# Patient Record
Sex: FEMALE | Race: WHITE | Employment: FULL TIME | URBAN - METROPOLITAN AREA
[De-identification: names, ages, dates, MRNs, and addresses within clinical notes are randomized per-mention and may not be internally consistent; named-entity substitution may affect disease eponyms.]

---

## 2018-07-19 PROBLEM — M77.11 LATERAL EPICONDYLITIS OF RIGHT ELBOW: Status: ACTIVE | Noted: 2018-07-19

## 2019-08-02 ENCOUNTER — HOSPITAL ENCOUNTER (OUTPATIENT)
Dept: ULTRASOUND IMAGING | Facility: HOSPITAL | Age: 44
Discharge: HOME | End: 2019-08-02
Attending: NURSE PRACTITIONER
Payer: COMMERCIAL

## 2019-08-02 DIAGNOSIS — M79.669 CALF PAIN, UNSPECIFIED LATERALITY: ICD-10-CM

## 2019-08-02 PROCEDURE — 93971 EXTREMITY STUDY: CPT | Performed by: NURSE PRACTITIONER

## 2019-08-09 ENCOUNTER — OFFICE VISIT (OUTPATIENT)
Dept: HEMATOLOGY/ONCOLOGY | Facility: HOSPITAL | Age: 44
End: 2019-08-09
Attending: INTERNAL MEDICINE
Payer: COMMERCIAL

## 2019-08-09 VITALS
HEIGHT: 62.5 IN | WEIGHT: 131 LBS | SYSTOLIC BLOOD PRESSURE: 105 MMHG | BODY MASS INDEX: 23.5 KG/M2 | RESPIRATION RATE: 18 BRPM | OXYGEN SATURATION: 97 % | DIASTOLIC BLOOD PRESSURE: 68 MMHG | HEART RATE: 97 BPM

## 2019-08-09 DIAGNOSIS — Z00.00 HEALTHCARE MAINTENANCE: Primary | ICD-10-CM

## 2019-08-09 PROCEDURE — 99243 OFF/OP CNSLTJ NEW/EST LOW 30: CPT | Performed by: INTERNAL MEDICINE

## 2019-08-09 NOTE — PROGRESS NOTES
[address] [facility] Note    Diagnosis: DVT of R Gastrocnemius (8/2/19): provoked by surgery     Treatment History:   1. Xarelto: Took 2 doses, but did not tolerate it   2. Eliquis: 8/9/19-Current     Visit Diagnosis:  No diagnosis found. Procedure Laterality Date   • HERNIA SURGERY     • OTHER      WISDOM TEETH   • RECTO CYSTOCELE REPAIR N/A 8/9/2013    Performed by Niko Weiner MD at [facility] MAIN OR     Social History    Socioeconomic History      Marital status:       Spouse [facility]

## 2019-08-09 NOTE — PROGRESS NOTES
Education Record    Learner:  Patient and Spouse    Disease / Diagnosis:DVT to RLE    Barriers / Limitations:  None   Comments:    Method:  Discussion   Comments:    General Topics:  Plan of care reviewed   Comments:    Outcome:  Shows understanding   Comm

## 2019-08-12 ENCOUNTER — TELEPHONE (OUTPATIENT)
Dept: HEMATOLOGY/ONCOLOGY | Facility: HOSPITAL | Age: 44
End: 2019-08-12

## 2019-08-12 NOTE — TELEPHONE ENCOUNTER
Patient called stating she has a bad head cold. Asking if Dr. Marcos Mckeon would prescribe her an antibiotic. Instructed patient to call her PCP or visit urgent care if unable to see PCP. Patient verbalized understanding.

## 2019-08-16 ENCOUNTER — HOSPITAL ENCOUNTER (OUTPATIENT)
Dept: ULTRASOUND IMAGING | Facility: HOSPITAL | Age: 44
Discharge: HOME | End: 2019-08-16
Attending: FAMILY MEDICINE
Payer: COMMERCIAL

## 2019-08-16 DIAGNOSIS — I82.491 ACUTE DEEP VEIN THROMBOSIS (DVT) OF OTHER SPECIFIED VEIN OF RIGHT LOWER EXTREMITY (HCC): ICD-10-CM

## 2019-08-16 PROCEDURE — 93971 EXTREMITY STUDY: CPT | Performed by: FAMILY MEDICINE

## 2019-09-05 ENCOUNTER — OFFICE VISIT (OUTPATIENT)
Dept: HEMATOLOGY/ONCOLOGY | Facility: HOSPITAL | Age: 44
End: 2019-09-05
Attending: INTERNAL MEDICINE
Payer: COMMERCIAL

## 2019-09-05 ENCOUNTER — TELEPHONE (OUTPATIENT)
Dept: HEMATOLOGY/ONCOLOGY | Facility: HOSPITAL | Age: 44
End: 2019-09-05

## 2019-09-05 ENCOUNTER — HOSPITAL ENCOUNTER (OUTPATIENT)
Dept: ULTRASOUND IMAGING | Age: 44
Discharge: HOME | End: 2019-09-05
Attending: NURSE PRACTITIONER
Payer: COMMERCIAL

## 2019-09-05 VITALS
WEIGHT: 135 LBS | HEART RATE: 90 BPM | DIASTOLIC BLOOD PRESSURE: 71 MMHG | TEMPERATURE: 97 F | HEIGHT: 62.52 IN | RESPIRATION RATE: 18 BRPM | OXYGEN SATURATION: 97 % | SYSTOLIC BLOOD PRESSURE: 107 MMHG | BODY MASS INDEX: 24.22 KG/M2

## 2019-09-05 DIAGNOSIS — Z86.718 HISTORY OF DEEP VENOUS THROMBOSIS (DVT) OF DISTAL VEIN OF RIGHT LOWER EXTREMITY: ICD-10-CM

## 2019-09-05 DIAGNOSIS — M79.89 RIGHT LEG SWELLING: Primary | ICD-10-CM

## 2019-09-05 DIAGNOSIS — M79.89 RIGHT LEG SWELLING: ICD-10-CM

## 2019-09-05 PROBLEM — N76.0 VAGINITIS: Status: ACTIVE | Noted: 2019-09-05

## 2019-09-05 PROBLEM — M20.10 HALLUX VALGUS, ACQUIRED: Status: ACTIVE | Noted: 2019-04-21

## 2019-09-05 PROBLEM — N83.209 CYST OF OVARY: Status: ACTIVE | Noted: 2019-09-05

## 2019-09-05 PROCEDURE — 99213 OFFICE O/P EST LOW 20 MIN: CPT | Performed by: NURSE PRACTITIONER

## 2019-09-05 PROCEDURE — 93971 EXTREMITY STUDY: CPT | Performed by: NURSE PRACTITIONER

## 2019-09-05 RX ORDER — AZITHROMYCIN 250 MG/1
TABLET, FILM COATED ORAL
Refills: 0 | COMMUNITY
Start: 2019-08-22 | End: 2021-03-08

## 2019-09-05 RX ORDER — ACETAMINOPHEN AND CODEINE PHOSPHATE 300; 30 MG/1; MG/1
TABLET ORAL
Refills: 0 | COMMUNITY
Start: 2019-08-02 | End: 2019-09-05

## 2019-09-05 NOTE — PROGRESS NOTES
ANP Visit Note    Patient Name: Jose Mukherjee   YOB: 1975   Medical Record Number: [id number]   CSN: [id number]   Date of visit: 9/5/2019       Chief Complaint/Reason for Visit:  Patient presents with:   Follow - Up: apn assessment     Hist Marital status:       Spouse name: Not on file      Number of children: Not on file      Years of education: Not on file      Highest education level: Not on file    Occupational History      Not on file    Social Needs      Financial resource strai 3  •  Levothyroxine Sodium (SYNTHROID) 75 MCG Oral Tab, Take 125 mcg by mouth daily. 62.5 over 2 day period, recently changed dosage since September , Disp: , Rfl:   •  Multiple Vitamins-Minerals (MULTIVITAMIN OR), Take  by mouth as needed. , Disp: , Rfl: month follow up with Dr. Albertina Marroquin: HIGH recent DVT on anticoagulation     Electronically Signed by:    Leah Vang NP-C  Nurse Practitioner  [facility]

## 2019-09-05 NOTE — PROGRESS NOTES
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 Patient also noticed bump on shin area.      Sanjuana Toscano

## 2019-09-05 NOTE — TELEPHONE ENCOUNTER
Patient called with questions regarding wearing her boot and some issues she is having.  Please advise

## 2019-09-05 NOTE — TELEPHONE ENCOUNTER
Spoke with patient. She states she has been taking eliquis as prescribed. Her leg has become more swollen and feels \"tight\". She noticed a new lump on her leg. APN notified. Patient will be seen in clinic this afternoon. Patient verbalized understanidng.

## 2019-09-06 ENCOUNTER — TELEPHONE (OUTPATIENT)
Dept: HEMATOLOGY/ONCOLOGY | Facility: HOSPITAL | Age: 44
End: 2019-09-06

## 2019-09-06 NOTE — TELEPHONE ENCOUNTER
Patient returned call. Discussed that US right leg was negative for DVT. Patient instructed to continue to take eliquis as prescribed. Can take elevating legs and mild compression socks during the day while standing.  No other issues or complaints at this t

## 2019-11-06 NOTE — PROGRESS NOTES
[facility] On[facility] Clinic Note    Diagnosis: DVT of R Gastrocnemius (8/2/19): provoked by surgery     Treatment History:   1. Xarelto: Took 2 doses, but did not tolerate it   2.  Eliquis: 8/9/19-11/7/19    Visit Diagnosis:  History of deep venou Disp: , Rfl:   apixaban (ELIQUIS) 5 MG Oral Tab, Take 1 tablet (5 mg total) by mouth 2 (two) times daily. , Disp: 60 tablet, Rfl: 3  apixaban (ELIQUIS STARTER PACK) 5 MG Oral Tab, Take 1 tablet (5 mg total) by mouth 2 (two) times daily.  10 mg twice a day fo 05/01/2016    HGB 14.5 05/01/2016    HCT 43.2 05/01/2016    MCV 95.2 05/01/2016    .0 05/01/2016     Lab Results   Component Value Date     05/01/2016    K 4.2 05/01/2016    CO2 28.0 05/01/2016     05/01/2016    BUN 9 05/01/2016    ALB 3

## 2019-11-07 ENCOUNTER — OFFICE VISIT (OUTPATIENT)
Dept: HEMATOLOGY/ONCOLOGY | Facility: HOSPITAL | Age: 44
End: 2019-11-07
Attending: INTERNAL MEDICINE
Payer: COMMERCIAL

## 2019-11-07 VITALS
OXYGEN SATURATION: 100 % | RESPIRATION RATE: 16 BRPM | SYSTOLIC BLOOD PRESSURE: 106 MMHG | BODY MASS INDEX: 24.47 KG/M2 | DIASTOLIC BLOOD PRESSURE: 68 MMHG | TEMPERATURE: 98 F | HEART RATE: 80 BPM | HEIGHT: 62.52 IN | WEIGHT: 136.38 LBS

## 2019-11-07 DIAGNOSIS — R53.83 FATIGUE, UNSPECIFIED TYPE: ICD-10-CM

## 2019-11-07 DIAGNOSIS — Z86.718 HISTORY OF DEEP VENOUS THROMBOSIS (DVT) OF DISTAL VEIN OF RIGHT LOWER EXTREMITY: Primary | ICD-10-CM

## 2019-11-07 PROCEDURE — 99213 OFFICE O/P EST LOW 20 MIN: CPT | Performed by: INTERNAL MEDICINE

## 2019-11-07 NOTE — PROGRESS NOTES
Education Record    Learner:  Patient    Disease / Diagnosis:DVT     Barriers / Limitations:  None   Comments:    Method:  Discussion   Comments:    General Topics:  Plan of care reviewed   Comments:    Outcome:  Shows understanding   Comments:    Patient

## 2019-11-08 ENCOUNTER — TELEPHONE (OUTPATIENT)
Dept: HEMATOLOGY/ONCOLOGY | Facility: HOSPITAL | Age: 44
End: 2019-11-08

## 2019-11-08 NOTE — TELEPHONE ENCOUNTER
Spoke with patient. She verbalized understanding. Follow-up appointment made. Yeny Rios MD  [facility]             Results reviewed.  Can you please let her know that all her tests came out normal. Her iron level is borderline normal, sh

## 2019-12-02 ENCOUNTER — TELEPHONE (OUTPATIENT)
Dept: HEMATOLOGY/ONCOLOGY | Facility: HOSPITAL | Age: 44
End: 2019-12-02

## 2019-12-02 NOTE — TELEPHONE ENCOUNTER
LVM for patient informing her that US order has been placed. Central Scheduling contact information provided.

## 2019-12-02 NOTE — TELEPHONE ENCOUNTER
Evans Charles called to say that the last time she was in she was taken off blood thinners.  She wants another US done because she is still in pain and she is looking to speak with a nurse about the possibility of getting an [address] and looking to just talk about georgina

## 2019-12-06 ENCOUNTER — TELEPHONE (OUTPATIENT)
Dept: HEMATOLOGY/ONCOLOGY | Facility: HOSPITAL | Age: 44
End: 2019-12-06

## 2019-12-06 ENCOUNTER — HOSPITAL ENCOUNTER (OUTPATIENT)
Dept: ULTRASOUND IMAGING | Age: 44
Discharge: HOME | End: 2019-12-06
Attending: INTERNAL MEDICINE
Payer: COMMERCIAL

## 2019-12-06 DIAGNOSIS — M79.89 RIGHT LEG SWELLING: ICD-10-CM

## 2019-12-06 DIAGNOSIS — Z86.718 HISTORY OF DEEP VENOUS THROMBOSIS (DVT) OF DISTAL VEIN OF RIGHT LOWER EXTREMITY: ICD-10-CM

## 2019-12-06 PROCEDURE — 93970 EXTREMITY STUDY: CPT | Performed by: INTERNAL MEDICINE

## 2019-12-06 NOTE — TELEPHONE ENCOUNTER
Spoke with patient. She verbalized understanding. Kaitlin Fonseca MD  [facility]             Results reviewed. US is negative for DVT. Thanks!

## 2019-12-31 ENCOUNTER — TELEPHONE (OUTPATIENT)
Dept: SCHEDULING | Age: 44
End: 2019-12-31

## 2020-02-05 NOTE — PROGRESS NOTES
[facility] Note    Diagnosis: DVT of R Gastrocnemius (8/2/19): provoked by surgery     Treatment History:   1. Xarelto: Took 2 doses, but did not tolerate it   2.  Eliquis: 8/9/19-11/7/19    Visit Diagnosis:  History of deep venou more. She occasionally feels some RLE tightness. She has not noticed and swelling, tenderness or bulging veins. No chest pain or dyspnea. Does feel a bit fatigued, but is only sleeping 6.5 hours a night.      Review of Systems: 12 Point ROS was completed an 02/06/20  1547   BP: 107/71   Pulse: 77   Resp: 16   Temp: 98.2 °F (36.8 °C)     General: NAD, AOX3  HEENT: clear op, mmm, no jvd, no scleral icterus  Extremities: Right and Left LE equal in size, no edema, no varicose veins   Lungs: no increased work of b

## 2020-02-06 ENCOUNTER — OFFICE VISIT (OUTPATIENT)
Dept: HEMATOLOGY/ONCOLOGY | Facility: HOSPITAL | Age: 45
End: 2020-02-06
Attending: INTERNAL MEDICINE
Payer: COMMERCIAL

## 2020-02-06 VITALS
TEMPERATURE: 98 F | OXYGEN SATURATION: 100 % | HEIGHT: 62.52 IN | WEIGHT: 132.19 LBS | RESPIRATION RATE: 16 BRPM | BODY MASS INDEX: 23.72 KG/M2 | HEART RATE: 77 BPM | DIASTOLIC BLOOD PRESSURE: 71 MMHG | SYSTOLIC BLOOD PRESSURE: 107 MMHG

## 2020-02-06 DIAGNOSIS — Z86.718 HISTORY OF DEEP VENOUS THROMBOSIS (DVT) OF DISTAL VEIN OF RIGHT LOWER EXTREMITY: Primary | ICD-10-CM

## 2020-02-06 LAB
BASOPHILS # BLD AUTO: 0.08 X10(3) UL (ref 0–0.2)
BASOPHILS NFR BLD AUTO: 1.4 %
D-DIMER: <0.27 UG/ML FEU (ref ?–0.5)
DEPRECATED HBV CORE AB SER IA-ACNC: 37.6 NG/ML (ref 12–240)
DEPRECATED RDW RBC AUTO: 47 FL (ref 35.1–46.3)
EOSINOPHIL # BLD AUTO: 0.15 X10(3) UL (ref 0–0.7)
EOSINOPHIL NFR BLD AUTO: 2.5 %
ERYTHROCYTE [DISTWIDTH] IN BLOOD BY AUTOMATED COUNT: 13.2 % (ref 11–15)
HCT VFR BLD AUTO: 43.1 % (ref 35–48)
HGB BLD-MCNC: 14.4 G/DL (ref 12–16)
IMM GRANULOCYTES # BLD AUTO: 0.02 X10(3) UL (ref 0–1)
IMM GRANULOCYTES NFR BLD: 0.3 %
LYMPHOCYTES # BLD AUTO: 2.67 X10(3) UL (ref 1–4)
LYMPHOCYTES NFR BLD AUTO: 45.1 %
MCH RBC QN AUTO: 32 PG (ref 26–34)
MCHC RBC AUTO-ENTMCNC: 33.4 G/DL (ref 31–37)
MCV RBC AUTO: 95.8 FL (ref 80–100)
MONOCYTES # BLD AUTO: 0.48 X10(3) UL (ref 0.1–1)
MONOCYTES NFR BLD AUTO: 8.1 %
NEUTROPHILS # BLD AUTO: 2.52 X10 (3) UL (ref 1.5–7.7)
NEUTROPHILS # BLD AUTO: 2.52 X10(3) UL (ref 1.5–7.7)
NEUTROPHILS NFR BLD AUTO: 42.6 %
PLATELET # BLD AUTO: 309 10(3)UL (ref 150–450)
RBC # BLD AUTO: 4.5 X10(6)UL (ref 3.8–5.3)
WBC # BLD AUTO: 5.9 X10(3) UL (ref 4–11)

## 2020-02-06 PROCEDURE — 99213 OFFICE O/P EST LOW 20 MIN: CPT | Performed by: INTERNAL MEDICINE

## 2020-02-06 NOTE — PROGRESS NOTES
Education Record    Learner:  Patient    Disease / Diagnosis:RLE DVT     Barriers / Limitations:  None   Comments:    Method:  Discussion   Comments:    General Topics:  Plan of care reviewed   Comments:    Outcome:  Shows understanding   Comments:    [place]

## 2020-02-07 ENCOUNTER — TELEPHONE (OUTPATIENT)
Dept: HEMATOLOGY/ONCOLOGY | Facility: HOSPITAL | Age: 45
End: 2020-02-07

## 2020-02-07 NOTE — TELEPHONE ENCOUNTER
Spoke with patient. She verbalized understanding    Maria Castro MD  [facility]             Results reviewed.  D -dimer and iron studies are normal. Thanks

## 2021-03-29 ENCOUNTER — OFFICE VISIT (OUTPATIENT)
Dept: UROLOGY | Facility: HOSPITAL | Age: 46
End: 2021-03-29
Attending: OBSTETRICS & GYNECOLOGY
Payer: COMMERCIAL

## 2021-03-29 VITALS — TEMPERATURE: 97 F | HEIGHT: 62.75 IN | WEIGHT: 135 LBS | BODY MASS INDEX: 24.22 KG/M2

## 2021-03-29 DIAGNOSIS — N81.6 RECTOCELE: Primary | ICD-10-CM

## 2021-03-29 PROCEDURE — 99212 OFFICE O/P EST SF 10 MIN: CPT

## 2021-04-01 ENCOUNTER — TELEPHONE (OUTPATIENT)
Dept: HEMATOLOGY/ONCOLOGY | Facility: HOSPITAL | Age: 46
End: 2021-04-01

## 2021-04-01 RX ORDER — SODIUM CHLORIDE, SODIUM LACTATE, POTASSIUM CHLORIDE, CALCIUM CHLORIDE 600; 310; 30; 20 MG/100ML; MG/100ML; MG/100ML; MG/100ML
INJECTION, SOLUTION INTRAVENOUS CONTINUOUS
Status: CANCELLED | OUTPATIENT
Start: 2021-04-01

## 2021-04-01 RX ORDER — ACETAMINOPHEN 500 MG
1000 TABLET ORAL ONCE
Status: CANCELLED | OUTPATIENT
Start: 2021-04-01 | End: 2021-04-01

## 2021-04-01 NOTE — TELEPHONE ENCOUNTER
Patient called and had some questions regarding blood thinners because she is having surgery on 4/19.

## 2021-04-02 ENCOUNTER — TELEPHONE (OUTPATIENT)
Dept: HEMATOLOGY/ONCOLOGY | Facility: HOSPITAL | Age: 46
End: 2021-04-02

## 2021-04-02 NOTE — TELEPHONE ENCOUNTER
Surgery is scheduled for April 19 - Rectocele pelvic surgery. Wants to know if she needs to go back on blood thinners. Does she need appointment with you? Patient is aware   Is out of the office this week. Will be back on 4/5.

## 2021-04-06 NOTE — PROGRESS NOTES
THE [facility] Clinic Note    Diagnosis: DVT of R Gastrocnemius (8/2/19): provoked by surgery     Treatment History:   1. Xarelto: Took 2 doses, but did not tolerate it   2.  Eliquis: 8/9/19-11/7/19    Visit Diagnosis:  History of deep venou more. She occasionally feels some RLE tightness. She has not noticed and swelling, tenderness or bulging veins. No chest pain or dyspnea. Does feel a bit fatigued, but is only sleeping 6.5 hours a night.      4/7/21: Follow up for a history of RLE DVT dx 8/ Vaping Use: Never used    Substance and Sexual Activity      Alcohol use: Yes        Comment: RARE WINE      Drug use: No     Family History   Problem Relation Age of Onset   • Other (Other) Father         blood clots        Physical Exam   04/07/21  154

## 2021-04-07 ENCOUNTER — OFFICE VISIT (OUTPATIENT)
Dept: HEMATOLOGY/ONCOLOGY | Facility: HOSPITAL | Age: 46
End: 2021-04-07
Attending: INTERNAL MEDICINE
Payer: COMMERCIAL

## 2021-04-07 VITALS
OXYGEN SATURATION: 99 % | WEIGHT: 137 LBS | DIASTOLIC BLOOD PRESSURE: 76 MMHG | SYSTOLIC BLOOD PRESSURE: 125 MMHG | HEART RATE: 80 BPM | BODY MASS INDEX: 25 KG/M2 | TEMPERATURE: 98 F | RESPIRATION RATE: 18 BRPM

## 2021-04-07 DIAGNOSIS — Z86.718 HISTORY OF DEEP VENOUS THROMBOSIS (DVT) OF DISTAL VEIN OF RIGHT LOWER EXTREMITY: Primary | ICD-10-CM

## 2021-04-07 PROCEDURE — 99214 OFFICE O/P EST MOD 30 MIN: CPT | Performed by: INTERNAL MEDICINE

## 2021-04-07 RX ORDER — ENOXAPARIN SODIUM 100 MG/ML
60 INJECTION SUBCUTANEOUS DAILY
Qty: 10 SYRINGE | Refills: 0 | Status: SHIPPED | OUTPATIENT
Start: 2021-04-07 | End: 2021-04-17

## 2021-04-07 NOTE — PROGRESS NOTES
Education Record    Learner:  Patient    Disease / Diagnosis:h/o DVT    Barriers / Limitations:  None   Comments:    Method:  Discussion   Comments:    General Topics:  Plan of care reviewed   Comments:    Outcome:  Shows understanding   Comments:    Leo

## 2021-04-08 ENCOUNTER — TELEPHONE (OUTPATIENT)
Dept: HEMATOLOGY/ONCOLOGY | Facility: HOSPITAL | Age: 46
End: 2021-04-08

## 2021-04-08 NOTE — TELEPHONE ENCOUNTER
Spoke with patient. She verbalized understanding. Instructions on lovenox provided. MD Alhaji Mcdermott, RN  Can you let her know that I talked to Dr. Lelia Fontana. He is ok with post-operative Loenox.  She should start the day after surgery

## 2021-04-09 ENCOUNTER — TELEPHONE (OUTPATIENT)
Dept: UROLOGY | Facility: HOSPITAL | Age: 46
End: 2021-04-09

## 2021-04-09 NOTE — TELEPHONE ENCOUNTER
Pt called, preparing [facility] paperwork, for upcoming surgery scheduled 4-19-21, planned posterior repair. Pt requests to be off work 4-19-21 through 4-25-21. 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   And return to work in the [employer]

## 2021-04-12 NOTE — TELEPHONE ENCOUNTER
Pt brought [facility] paperwork, completed, not signed yet by Dr. Joss Gardiner. Called pt to notify paperwork not complete, she requests it be faxed unsigned to her spouse work [phone].   Dr. Joss Gardiner signed the return to work letter on [employer], faxed with pa

## 2021-04-16 ENCOUNTER — LAB ENCOUNTER (OUTPATIENT)
Dept: LAB | Facility: HOSPITAL | Age: 46
End: 2021-04-16
Attending: OBSTETRICS & GYNECOLOGY
Payer: COMMERCIAL

## 2021-04-16 DIAGNOSIS — N81.6 RECTOCELE: ICD-10-CM

## 2021-04-19 ENCOUNTER — ANESTHESIA EVENT (OUTPATIENT)
Dept: SURGERY | Facility: HOSPITAL | Age: 46
End: 2021-04-19
Payer: COMMERCIAL

## 2021-04-19 ENCOUNTER — HOSPITAL ENCOUNTER (OUTPATIENT)
Facility: HOSPITAL | Age: 46
Setting detail: HOSPITAL OUTPATIENT SURGERY
Discharge: HOME | End: 2021-04-19
Attending: OBSTETRICS & GYNECOLOGY | Admitting: OBSTETRICS & GYNECOLOGY
Payer: COMMERCIAL

## 2021-04-19 ENCOUNTER — ANESTHESIA (OUTPATIENT)
Dept: SURGERY | Facility: HOSPITAL | Age: 46
End: 2021-04-19
Payer: COMMERCIAL

## 2021-04-19 VITALS
SYSTOLIC BLOOD PRESSURE: 118 MMHG | HEART RATE: 84 BPM | TEMPERATURE: 98 F | OXYGEN SATURATION: 99 % | HEIGHT: 62.25 IN | DIASTOLIC BLOOD PRESSURE: 78 MMHG | WEIGHT: 136.44 LBS | BODY MASS INDEX: 24.79 KG/M2 | RESPIRATION RATE: 18 BRPM

## 2021-04-19 DIAGNOSIS — N81.6 RECTOCELE: Primary | ICD-10-CM

## 2021-04-19 PROCEDURE — 88305 TISSUE EXAM BY PATHOLOGIST: CPT | Performed by: OBSTETRICS & GYNECOLOGY

## 2021-04-19 PROCEDURE — 81025 URINE PREGNANCY TEST: CPT | Performed by: OBSTETRICS & GYNECOLOGY

## 2021-04-19 PROCEDURE — 0WQN0ZZ REPAIR FEMALE PERINEUM, OPEN APPROACH: ICD-10-PCS | Performed by: OBSTETRICS & GYNECOLOGY

## 2021-04-19 PROCEDURE — 51798 US URINE CAPACITY MEASURE: CPT | Performed by: OBSTETRICS & GYNECOLOGY

## 2021-04-19 PROCEDURE — S0028 INJECTION, FAMOTIDINE, 20 MG: HCPCS | Performed by: ANESTHESIOLOGY

## 2021-04-19 PROCEDURE — 0JBB0ZZ EXCISION OF PERINEUM SUBCUTANEOUS TISSUE AND FASCIA, OPEN APPROACH: ICD-10-PCS | Performed by: OBSTETRICS & GYNECOLOGY

## 2021-04-19 PROCEDURE — 0JQC0ZZ REPAIR PELVIC REGION SUBCUTANEOUS TISSUE AND FASCIA, OPEN APPROACH: ICD-10-PCS | Performed by: OBSTETRICS & GYNECOLOGY

## 2021-04-19 RX ORDER — LIDOCAINE HYDROCHLORIDE 10 MG/ML
INJECTION, SOLUTION EPIDURAL; INFILTRATION; INTRACAUDAL; PERINEURAL AS NEEDED
Status: DISCONTINUED | OUTPATIENT
Start: 2021-04-19 | End: 2021-04-19 | Stop reason: SURG

## 2021-04-19 RX ORDER — BUPIVACAINE HYDROCHLORIDE AND EPINEPHRINE 2.5; 5 MG/ML; UG/ML
INJECTION, SOLUTION EPIDURAL; INFILTRATION; INTRACAUDAL; PERINEURAL AS NEEDED
Status: DISCONTINUED | OUTPATIENT
Start: 2021-04-19 | End: 2021-04-19 | Stop reason: HOSPADM

## 2021-04-19 RX ORDER — HYDROMORPHONE HYDROCHLORIDE 1 MG/ML
0.4 INJECTION, SOLUTION INTRAMUSCULAR; INTRAVENOUS; SUBCUTANEOUS EVERY 5 MIN PRN
Status: DISCONTINUED | OUTPATIENT
Start: 2021-04-19 | End: 2021-04-19

## 2021-04-19 RX ORDER — IBUPROFEN 600 MG/1
600 TABLET ORAL EVERY 6 HOURS
Qty: 40 TABLET | Refills: 0 | Status: SHIPPED | OUTPATIENT
Start: 2021-04-19 | End: 2021-05-28 | Stop reason: ALTCHOICE

## 2021-04-19 RX ORDER — HYDROCODONE BITARTRATE AND ACETAMINOPHEN 5; 325 MG/1; MG/1
1 TABLET ORAL EVERY 6 HOURS PRN
Qty: 20 TABLET | Refills: 0 | Status: SHIPPED | OUTPATIENT
Start: 2021-04-19 | End: 2021-05-28 | Stop reason: ALTCHOICE

## 2021-04-19 RX ORDER — HYDROCODONE BITARTRATE AND ACETAMINOPHEN 5; 325 MG/1; MG/1
1 TABLET ORAL AS NEEDED
Status: DISCONTINUED | OUTPATIENT
Start: 2021-04-19 | End: 2021-04-19

## 2021-04-19 RX ORDER — METOCLOPRAMIDE HYDROCHLORIDE 5 MG/ML
10 INJECTION INTRAMUSCULAR; INTRAVENOUS AS NEEDED
Status: DISCONTINUED | OUTPATIENT
Start: 2021-04-19 | End: 2021-04-19

## 2021-04-19 RX ORDER — ESMOLOL HYDROCHLORIDE 10 MG/ML
INJECTION INTRAVENOUS AS NEEDED
Status: DISCONTINUED | OUTPATIENT
Start: 2021-04-19 | End: 2021-04-19 | Stop reason: SURG

## 2021-04-19 RX ORDER — SODIUM CHLORIDE, SODIUM LACTATE, POTASSIUM CHLORIDE, CALCIUM CHLORIDE 600; 310; 30; 20 MG/100ML; MG/100ML; MG/100ML; MG/100ML
INJECTION, SOLUTION INTRAVENOUS CONTINUOUS
Status: DISCONTINUED | OUTPATIENT
Start: 2021-04-19 | End: 2021-04-19

## 2021-04-19 RX ORDER — DEXAMETHASONE SODIUM PHOSPHATE 4 MG/ML
VIAL (ML) INJECTION AS NEEDED
Status: DISCONTINUED | OUTPATIENT
Start: 2021-04-19 | End: 2021-04-19 | Stop reason: SURG

## 2021-04-19 RX ORDER — HYDROCODONE BITARTRATE AND ACETAMINOPHEN 5; 325 MG/1; MG/1
2 TABLET ORAL AS NEEDED
Status: DISCONTINUED | OUTPATIENT
Start: 2021-04-19 | End: 2021-04-19

## 2021-04-19 RX ORDER — ONDANSETRON 2 MG/ML
4 INJECTION INTRAMUSCULAR; INTRAVENOUS AS NEEDED
Status: DISCONTINUED | OUTPATIENT
Start: 2021-04-19 | End: 2021-04-19

## 2021-04-19 RX ORDER — GLYCOPYRROLATE 0.2 MG/ML
INJECTION, SOLUTION INTRAMUSCULAR; INTRAVENOUS AS NEEDED
Status: DISCONTINUED | OUTPATIENT
Start: 2021-04-19 | End: 2021-04-19 | Stop reason: SURG

## 2021-04-19 RX ORDER — ACETAMINOPHEN 500 MG
1000 TABLET ORAL ONCE AS NEEDED
Status: DISCONTINUED | OUTPATIENT
Start: 2021-04-19 | End: 2021-04-19

## 2021-04-19 RX ORDER — FAMOTIDINE 10 MG/ML
INJECTION, SOLUTION INTRAVENOUS AS NEEDED
Status: DISCONTINUED | OUTPATIENT
Start: 2021-04-19 | End: 2021-04-19 | Stop reason: SURG

## 2021-04-19 RX ORDER — PHENYLEPHRINE HCL 10 MG/ML
VIAL (ML) INJECTION AS NEEDED
Status: DISCONTINUED | OUTPATIENT
Start: 2021-04-19 | End: 2021-04-19 | Stop reason: SURG

## 2021-04-19 RX ORDER — CEFAZOLIN SODIUM/WATER 2 G/20 ML
2 SYRINGE (ML) INTRAVENOUS ONCE
Status: COMPLETED | OUTPATIENT
Start: 2021-04-19 | End: 2021-04-19

## 2021-04-19 RX ORDER — ACETAMINOPHEN 500 MG
1000 TABLET ORAL EVERY 6 HOURS PRN
COMMUNITY

## 2021-04-19 RX ORDER — TRAMADOL HYDROCHLORIDE 50 MG/1
50 TABLET ORAL EVERY 6 HOURS PRN
Qty: 28 TABLET | Refills: 0 | Status: SHIPPED | OUTPATIENT
Start: 2021-04-19 | End: 2021-05-28 | Stop reason: ALTCHOICE

## 2021-04-19 RX ORDER — MIDAZOLAM HYDROCHLORIDE 1 MG/ML
INJECTION INTRAMUSCULAR; INTRAVENOUS AS NEEDED
Status: DISCONTINUED | OUTPATIENT
Start: 2021-04-19 | End: 2021-04-19 | Stop reason: SURG

## 2021-04-19 RX ORDER — DIPHENHYDRAMINE HYDROCHLORIDE 50 MG/ML
INJECTION INTRAMUSCULAR; INTRAVENOUS AS NEEDED
Status: DISCONTINUED | OUTPATIENT
Start: 2021-04-19 | End: 2021-04-19 | Stop reason: SURG

## 2021-04-19 RX ADMIN — FAMOTIDINE 20 MG: 10 INJECTION, SOLUTION INTRAVENOUS at 13:35:00

## 2021-04-19 RX ADMIN — PHENYLEPHRINE HCL 100 MCG: 10 MG/ML VIAL (ML) INJECTION at 13:23:00

## 2021-04-19 RX ADMIN — PHENYLEPHRINE HCL 100 MCG: 10 MG/ML VIAL (ML) INJECTION at 13:34:00

## 2021-04-19 RX ADMIN — PHENYLEPHRINE HCL 100 MCG: 10 MG/ML VIAL (ML) INJECTION at 13:07:00

## 2021-04-19 RX ADMIN — CEFAZOLIN SODIUM/WATER 2 G: 2 G/20 ML SYRINGE (ML) INTRAVENOUS at 13:02:00

## 2021-04-19 RX ADMIN — SODIUM CHLORIDE, SODIUM LACTATE, POTASSIUM CHLORIDE, CALCIUM CHLORIDE: 600; 310; 30; 20 INJECTION, SOLUTION INTRAVENOUS at 13:18:00

## 2021-04-19 RX ADMIN — DEXAMETHASONE SODIUM PHOSPHATE 8 MG: 4 MG/ML VIAL (ML) INJECTION at 13:35:00

## 2021-04-19 RX ADMIN — LIDOCAINE HYDROCHLORIDE 50 MG: 10 INJECTION, SOLUTION EPIDURAL; INFILTRATION; INTRACAUDAL; PERINEURAL at 12:56:00

## 2021-04-19 RX ADMIN — SODIUM CHLORIDE, SODIUM LACTATE, POTASSIUM CHLORIDE, CALCIUM CHLORIDE: 600; 310; 30; 20 INJECTION, SOLUTION INTRAVENOUS at 12:53:00

## 2021-04-19 RX ADMIN — MIDAZOLAM HYDROCHLORIDE 2 MG: 1 INJECTION INTRAMUSCULAR; INTRAVENOUS at 12:55:00

## 2021-04-19 RX ADMIN — PHENYLEPHRINE HCL 100 MCG: 10 MG/ML VIAL (ML) INJECTION at 13:04:00

## 2021-04-19 RX ADMIN — DIPHENHYDRAMINE HYDROCHLORIDE 50 MG: 50 INJECTION INTRAMUSCULAR; INTRAVENOUS at 13:35:00

## 2021-04-19 RX ADMIN — GLYCOPYRROLATE 0.4 MG: 0.2 INJECTION, SOLUTION INTRAMUSCULAR; INTRAVENOUS at 13:08:00

## 2021-04-19 RX ADMIN — ESMOLOL HYDROCHLORIDE 10 MG: 10 INJECTION INTRAVENOUS at 13:16:00

## 2021-04-19 RX ADMIN — PHENYLEPHRINE HCL 200 MCG: 10 MG/ML VIAL (ML) INJECTION at 13:26:00

## 2021-04-19 NOTE — ANESTHESIA POSTPROCEDURE EVALUATION
[address] Patient Status:  Hospital Outpatient Surgery   Age/Gender 54year old female MRN [id number]   Location [address] [facility] Attending Nicole Barber MD   Hosp Day # 0 PCP Kellen Amos MD       A

## 2021-04-19 NOTE — ANESTHESIA PREPROCEDURE EVALUATION
PRE-OP EVALUATION    Patient Name: Carl Talbot    Admit Diagnosis: RECTOCELE    Pre-op Diagnosis: RECTOCELE    POSTERIOR REPAIR OF RECTOCELE    Anesthesia Procedure: POSTERIOR REPAIR OF RECTOCELE (N/A )    Surgeon(s) and Role:     Tushar Prieto, allergy, nausea, vomiting, dental trauma, pain management modalities, transfusion if needed, etc. Questions answered. Accepts. The consent was signed without further questions.       Plan/risks discussed with: patient and spouse                Present on Ad

## 2021-04-19 NOTE — ANESTHESIA PROCEDURE NOTES
Airway  Date/Time: 4/19/2021 12:59 PM  Urgency: elective    Airway not difficult    General Information and Staff    Patient location during procedure: OR  Anesthesiologist: Albert Bal MD  Performed: anesthesiologist     Indications and Patient Conditi

## 2021-04-19 NOTE — H&P
[facility] Patient Status:  [facility] Outpatient Surgery    1975 MRN [id number]   Location [address] [facility] Attending Shanthi Metzger MD   Hosp Day # 0 PCP Luzmaria Hughes MD     Date of 12/20/2020, SpO2 97 %. HEENT: Exam is unremarkable. Without scleral icterus. Mucous membranes are moist. Pupils are equal and round, reactive to light and accommodate. Pupils are approximately 3mm and react to 2mm with reaction to light.   Oropharynx is

## 2021-04-19 NOTE — OPERATIVE REPORT
PRE-OP DIAGNOSIS:  Symptomatic rectocele    POST-OP DIAGNOSIS:  Same with perineal mass    PROCEDURE:  Posterior colporrhaphy; excision of perineal mass    SURGEON:  Bridgette Santiago MD    ASSISTANT:  Shukri Sánchez MD    ANESTHESIA:  General    SPECIMEN:

## 2021-04-22 ENCOUNTER — TELEPHONE (OUTPATIENT)
Dept: HEMATOLOGY/ONCOLOGY | Facility: HOSPITAL | Age: 46
End: 2021-04-22

## 2021-04-22 ENCOUNTER — HOSPITAL ENCOUNTER (OUTPATIENT)
Dept: ULTRASOUND IMAGING | Facility: HOSPITAL | Age: 46
Discharge: HOME | End: 2021-04-22
Attending: INTERNAL MEDICINE
Payer: COMMERCIAL

## 2021-04-22 DIAGNOSIS — Z86.718 HISTORY OF DEEP VENOUS THROMBOSIS (DVT) OF DISTAL VEIN OF RIGHT LOWER EXTREMITY: Primary | ICD-10-CM

## 2021-04-22 DIAGNOSIS — M79.89 RIGHT LEG SWELLING: ICD-10-CM

## 2021-04-22 DIAGNOSIS — Z86.718 HISTORY OF DEEP VENOUS THROMBOSIS (DVT) OF DISTAL VEIN OF RIGHT LOWER EXTREMITY: ICD-10-CM

## 2021-04-22 PROCEDURE — 93971 EXTREMITY STUDY: CPT | Performed by: INTERNAL MEDICINE

## 2021-04-22 NOTE — TELEPHONE ENCOUNTER
Spoke with patient. She verbalized understanding. Esteban Niño MD  [facility]  Results reviewed. Doppler is negative.  Thanks

## 2021-04-22 NOTE — TELEPHONE ENCOUNTER
Patient called and said that her right leg is feeling numb and achy. It's the same leg that she has had her blood clots.

## 2021-04-22 NOTE — TELEPHONE ENCOUNTER
Toxicities: Enoxaparin Sodium 60mg injection daily 4/20/2021-4/29/2021    Right Calf Numb/Dull Aching Pain: (Merlin Graves had surgery on 4/19/2021. She started her enoxaparin sodium injections on 4/20/201.  This morning she feels her right calf is numb with a d

## 2021-04-22 NOTE — TELEPHONE ENCOUNTER
I called Colt Malik to let her know Dr Voncille Gitelman has ordered an ultra sound venous doppler of her right lower leg. She has a 1:30 pm appointment this afternoon at the [address] entrance - Out Patient Registration.  She verbalized understanding and will

## 2021-05-28 ENCOUNTER — OFFICE VISIT (OUTPATIENT)
Dept: UROLOGY | Facility: HOSPITAL | Age: 46
End: 2021-05-28
Attending: OBSTETRICS & GYNECOLOGY
Payer: COMMERCIAL

## 2021-05-28 VITALS — WEIGHT: 136 LBS | TEMPERATURE: 97 F | HEIGHT: 62.25 IN | BODY MASS INDEX: 24.71 KG/M2

## 2021-05-28 DIAGNOSIS — Z98.890 POST-OPERATIVE STATE: Primary | ICD-10-CM

## 2021-05-28 PROCEDURE — 99212 OFFICE O/P EST SF 10 MIN: CPT

## 2021-10-19 ENCOUNTER — TELEPHONE (OUTPATIENT)
Dept: UROLOGY | Facility: HOSPITAL | Age: 46
End: 2021-10-19

## 2021-10-19 NOTE — TELEPHONE ENCOUNTER
Pt called asking if she can have an earlier appt. She is scheduled for 11/12/21 for her 6 mos post-op exam, but states \"I'm feeling same situation as before surgery\". Called pt back and left vm asking her to call us back to discuss further.

## 2021-11-12 ENCOUNTER — OFFICE VISIT (OUTPATIENT)
Dept: UROLOGY | Facility: HOSPITAL | Age: 46
End: 2021-11-12
Attending: OBSTETRICS & GYNECOLOGY
Payer: COMMERCIAL

## 2021-11-12 VITALS — WEIGHT: 136 LBS | HEIGHT: 62.25 IN | BODY MASS INDEX: 24.71 KG/M2 | TEMPERATURE: 97 F

## 2021-11-12 DIAGNOSIS — Z98.890 POST-OPERATIVE STATE: ICD-10-CM

## 2021-11-12 DIAGNOSIS — K59.00 CONSTIPATION: Primary | ICD-10-CM

## 2021-11-12 DIAGNOSIS — N81.89 PELVIC FLOOR WEAKNESS: ICD-10-CM

## 2021-11-12 PROCEDURE — 99212 OFFICE O/P EST SF 10 MIN: CPT

## 2021-12-14 ENCOUNTER — TELEPHONE (OUTPATIENT)
Dept: PHYSICAL THERAPY | Facility: HOSPITAL | Age: 46
End: 2021-12-14

## 2021-12-16 ENCOUNTER — OFFICE VISIT (OUTPATIENT)
Dept: PHYSICAL THERAPY | Age: 46
End: 2021-12-16
Attending: OBSTETRICS & GYNECOLOGY
Payer: COMMERCIAL

## 2021-12-16 DIAGNOSIS — N81.89 PELVIC FLOOR WEAKNESS: ICD-10-CM

## 2021-12-16 PROCEDURE — 97161 PT EVAL LOW COMPLEX 20 MIN: CPT

## 2021-12-16 PROCEDURE — 97110 THERAPEUTIC EXERCISES: CPT

## 2021-12-16 NOTE — PROGRESS NOTES
MUSCULOSKELETAL AND PELVIC FLOOR EVALUATION:   Referring Physician: Dr. Lelia Fontana  Diagnosis:    Pelvic floor weakness (S06.78)   Date of Service: 12/16/2021   March next MD appt.   PATIENT SUMMARY   Elian Sesay is a 51year old female  who presents to day  Frequency of bowel movements: 1 -2 x day, or every other day  Stool consistency: [place] Stool Scale: Type 3-4  Do you strain with defecation: No   Laxative use: No    SEXUAL HEALTH STATUS    Abdominal soreness after intercourse 3 weeks ago - Aching 7 ER    Special Tests  Not tested     Informed consent for internal pelvic evaluation given: Yes    External Observation:   Voluntary contraction: present   Voluntary relaxation: present  Involuntary contraction: present  Involuntary relaxation: present    M Therapeutic Exercise and Home Exercise Program instruction     Education or treatment limitation: None  Rehab Potential:good      Patient/Family/Caregiver was advised of these findings, precautions, and treatment options and has agreed to actively particip

## 2021-12-21 ENCOUNTER — OFFICE VISIT (OUTPATIENT)
Dept: PHYSICAL THERAPY | Age: 46
End: 2021-12-21
Attending: OBSTETRICS & GYNECOLOGY
Payer: COMMERCIAL

## 2021-12-21 DIAGNOSIS — N81.89 PELVIC FLOOR WEAKNESS: ICD-10-CM

## 2021-12-21 PROCEDURE — 97112 NEUROMUSCULAR REEDUCATION: CPT

## 2021-12-21 NOTE — PROGRESS NOTES
Dx: Pelvic floor weakness (N81.89)         Insurance (Authorized # of Visits):  10 visits recommended           Authorizing Physician: Dr. Damien Quintero  Next MD visit: none scheduled  Fall Risk: standard         Precautions: DVT  wheat, yeast, dairy          [facility] [place] PELVIC FLOOR EVALUATION:   Referring Physician: Dr. Warren Bhagat  Diagnosis:    Pelvic floor weakness (R00.15)   Date of Service: 12/16/2021   March next MD appt.   PATIENT SUMMARY   Corine Gupta is a 51year old female  who presents to therapy today with com movements: 1 -2 x day, or every other day  Stool consistency: [place] Stool Scale: Type 3-4  Do you strain with defecation: No   Laxative use: No    SEXUAL HEALTH STATUS    Abdominal soreness after intercourse 3 weeks ago - Aching 7/10  For day after.  Took Tests  Not tested     Informed consent for internal pelvic evaluation given: Yes    External Observation:   Voluntary contraction: present   Voluntary relaxation: present  Involuntary contraction: present  Involuntary relaxation: present    Mons pubis: WNL and Home Exercise Program instruction     Education or treatment limitation: None  Rehab Potential:good      Patient/Family/Caregiver was advised of these findings, precautions, and treatment options and has agreed to actively participate in planning and f

## 2022-01-04 ENCOUNTER — APPOINTMENT (OUTPATIENT)
Dept: PHYSICAL THERAPY | Age: 47
End: 2022-01-04
Attending: OBSTETRICS & GYNECOLOGY
Payer: COMMERCIAL

## 2022-01-06 ENCOUNTER — APPOINTMENT (OUTPATIENT)
Dept: PHYSICAL THERAPY | Age: 47
End: 2022-01-06
Attending: OBSTETRICS & GYNECOLOGY
Payer: COMMERCIAL

## 2022-01-11 ENCOUNTER — OFFICE VISIT (OUTPATIENT)
Dept: PHYSICAL THERAPY | Age: 47
End: 2022-01-11
Attending: OBSTETRICS & GYNECOLOGY
Payer: COMMERCIAL

## 2022-01-11 DIAGNOSIS — N81.89 PELVIC FLOOR WEAKNESS: ICD-10-CM

## 2022-01-11 PROCEDURE — 97110 THERAPEUTIC EXERCISES: CPT

## 2022-01-11 NOTE — PROGRESS NOTES
Dx: Pelvic floor weakness (N81.89)         Insurance (Authorized # of Visits):  10 visits recommended           Authorizing Physician: Dr. Gregory Gupta  Next MD visit: none scheduled  Fall Risk: standard         Precautions: DVT  wheat, yeast, dairy          [facility] [place] 200-300 and 1100 900   Contract relax 3 sec x 10 and release for 5 sec   Breathing exercise  And visual imagery with contraction to release pelvic floor during th eday                                   HEP: 12/21/2021 : PF pre-activation : SUSI, table top laugh, sneeze.  Urgency with full bladder - no leak  Abdominal/Vaginal Pressure complaints: after intercourse  Urinary Frequency: 3-4 hours    Pad use: no  Pad Change frequency: no  Nocturia: 0-1 x day  Fluid Intake: 6-8 cups panda, able to have fruit and education.      OBJECTIVE:   Posture: normal. Observe sitting with legs crossed  Pelvic Alignment: normal   Deep Tendon Reflexes:  not tested  Gait: not tested    Abdominal Wall Integrity: to be tested    Range Of Motion  Lumbar AROM screen: normal all dire increase pelvic support and reduce prolapse symptoms  Patient will report no tail bone pain majority of the week with good sitting posture  Patient will be independent with appropriate positioning for BM and technique.      Frequency / Duration: Patient siri

## 2022-01-13 ENCOUNTER — APPOINTMENT (OUTPATIENT)
Dept: PHYSICAL THERAPY | Age: 47
End: 2022-01-13
Attending: OBSTETRICS & GYNECOLOGY
Payer: COMMERCIAL

## 2022-01-18 ENCOUNTER — OFFICE VISIT (OUTPATIENT)
Dept: PHYSICAL THERAPY | Age: 47
End: 2022-01-18
Attending: OBSTETRICS & GYNECOLOGY
Payer: COMMERCIAL

## 2022-01-18 DIAGNOSIS — N81.89 PELVIC FLOOR WEAKNESS: ICD-10-CM

## 2022-01-18 PROCEDURE — 97112 NEUROMUSCULAR REEDUCATION: CPT

## 2022-01-18 NOTE — PROGRESS NOTES
Dx: Pelvic floor weakness (N81.89)         Insurance (Authorized # of Visits):  10 visits recommended           Authorizing Physician: Dr. Liudmila Saunders  Next MD visit: none scheduled  Fall Risk: standard         Precautions: DVT  wheat, yeast, dairy          Charmaine Estevez TX#: 4/10 Date:                 TX#: 5/ Date:    Tx#: 6/   NMRED:  PF arnaud 10 sec x 10     PF pre-activation BKFO alternate x 10    PF pre-activation table top alternate x 10     PF pre-activation double arm raises supine x x 10    TE:    Review Sympto education , Exercise: elliptical, walking, bike    POPDI-6 : 41.67  CRAD-8: 31.25  SABINE-6: 25  PFDI-20 : 98/300    Marie Foss describes prior level of function \"felt like ball falling out. \" Pt goals include less discomfort after surgery. PT discussed evaluation findings, pathology, POC and HEP. Pt voiced understanding and performs HEP correctly without reported pain. Skilled Pelvic Physical Therapy is medically necessary to address the above impairments and reach functional goals.  Patient diagrams and pelvic model and bladder normatives, positioning with BM, breath/belly hard     Patient was instructed in and issued a HEP for: PF facilitation: hip add, hip add with bridge and seated hip ER.  PF isolation arnaud 3 sec x 10    Charges: PT Mora ESPINOZA

## 2022-01-20 ENCOUNTER — APPOINTMENT (OUTPATIENT)
Dept: PHYSICAL THERAPY | Age: 47
End: 2022-01-20
Attending: OBSTETRICS & GYNECOLOGY
Payer: COMMERCIAL

## 2022-01-25 ENCOUNTER — OFFICE VISIT (OUTPATIENT)
Dept: PHYSICAL THERAPY | Age: 47
End: 2022-01-25
Attending: OBSTETRICS & GYNECOLOGY
Payer: COMMERCIAL

## 2022-01-25 DIAGNOSIS — N81.89 PELVIC FLOOR WEAKNESS: ICD-10-CM

## 2022-01-25 PROCEDURE — 97112 NEUROMUSCULAR REEDUCATION: CPT

## 2022-01-25 NOTE — PROGRESS NOTES
Dx: Pelvic floor weakness (N81.89)         Insurance (Authorized # of Visits):  10 visits recommended           Authorizing Physician: Dr. Chaparro Persaud  Next MD visit: none scheduled  Fall Risk: standard         Precautions: DVT  wheat, yeast, dairy          [facility] [place] arnaud 10 sec x 10     PF pre-activation BKFO alternate x 10    PF pre-activation table top alternate x 10     PF pre-activation double arm raises supine x x 10    TE:    Review Symptoms and exercises  INternal PF stretch 200-300 and 1100 900   Contract rela Full menopause  Urodynamic Test: see chart  Manometry: no  Occupation/Activities: Special education , Exercise: elliptical, walking, bike    POPDI-6 : 41.67  CRAD-8: 31.25  SABINE-6: 25  PFDI-20 : 98/300    Vidal Falcon describes prior level of Signs and symptoms are consistent with diagnosis of  Pelvic floor weakness (N81.89). Pt and PT discussed evaluation findings, pathology, POC and HEP. Pt voiced understanding and performs HEP correctly without reported pain.  Skilled Pelvic Physical Therapy and expectations with treatment outcomes.  Educated on pelvic anatomy and function with diagrams and pelvic model and bladder normatives, positioning with BM, breath/belly hard     Patient was instructed in and issued a HEP for: PF facilitation: hip add, hi

## 2022-01-27 ENCOUNTER — APPOINTMENT (OUTPATIENT)
Dept: PHYSICAL THERAPY | Age: 47
End: 2022-01-27
Attending: OBSTETRICS & GYNECOLOGY
Payer: COMMERCIAL

## 2022-02-01 ENCOUNTER — TELEPHONE (OUTPATIENT)
Dept: PHYSICAL THERAPY | Age: 47
End: 2022-02-01

## 2022-02-01 NOTE — TELEPHONE ENCOUNTER
Return call. Pt question was regarding needing additional visits and thought there was appt tonight. Left message I added appt times at 430. Let us know if does not work.

## 2022-02-02 NOTE — TELEPHONE ENCOUNTER
Able to communicate with patient. Pt report soreness after exercises. Discuss modifications and patient may benefit from more time doing the exercises independently before returning to 2/22 appt.  Continue to be on wait list.

## 2022-02-15 ENCOUNTER — APPOINTMENT (OUTPATIENT)
Dept: PHYSICAL THERAPY | Age: 47
End: 2022-02-15
Attending: OBSTETRICS & GYNECOLOGY
Payer: COMMERCIAL

## 2022-02-15 ENCOUNTER — TELEPHONE (OUTPATIENT)
Dept: PHYSICAL THERAPY | Facility: HOSPITAL | Age: 47
End: 2022-02-15

## 2022-02-22 ENCOUNTER — OFFICE VISIT (OUTPATIENT)
Dept: PHYSICAL THERAPY | Age: 47
End: 2022-02-22
Attending: OBSTETRICS & GYNECOLOGY
Payer: COMMERCIAL

## 2022-02-22 PROCEDURE — 97112 NEUROMUSCULAR REEDUCATION: CPT

## 2022-02-22 NOTE — PROGRESS NOTES
Dx: Pelvic floor weakness (N81.89)         Insurance (Authorized # of Visits):  10 visits recommended           Authorizing Physician: Dr. Romain Angel  Next MD visit: none scheduled  Fall Risk: standard         Precautions: DVT  wheat, yeast, dairy          Subjective:   Less pressure and symptoms. Yesterday,  notice some pressure symptoms that resolved today. Noticing only at night if symptoms are present. Patient feels she is stronger and has more awareness of pelvic floor contraction. Request recheck pelvic floor next session. Pain: Not applicable/10      Objective:   Ergonomics in leisure positions  NMRED 10-12.0 mvc  < 6.0 mvr for 8 sec counts      1/13/2022  3686-6691 and 100-200 restricted. Good releases with stretching   Review HEP and return to exercises this week. 12/21/2021  Patient was instructed in HEP and issued a handout. Patient demonstrate correct technique with HEP. NMRED: 7-13 mvc for 10 sec x 10, resting tone normal <4.0 mvr      Assessment: Patient continues to have improved symptoms since stretching. Pt has improve pelvic floor strength. Challenged with standing exercises but able improve control with breaking up movement. Resting tone     Goals: (to be met in isits)  Patient will demonstrate independence in performing home exercise program.  Patient will demonstrate pelvic floor contraction average 10.0 mvc in 10 sec and 10 reps to increase pelvic support and reduce prolapse symptoms - improvement  Patient will report no tail bone pain majority of the week with good sitting posture  Patient will be independent with appropriate positioning for BM and technique.      Plan:  Continue PT to restore pelvic floor strength and coordination with functional tasks with neuromuscular re-education and therapeutic exercise  Date: 12/21/2021  TX#: 2/10 Date:   1/11/2022               TX#: 3/10 Date:     1/18/2022             TX#: 4/10 Date:      1/25/2022            TX#: 5/10 Date: 2/22/2022   Tx#: 6/10 NMRED:  PF arnaud 10 sec x 10     PF pre-activation BKFO alternate x 10    PF pre-activation table top alternate x 10     PF pre-activation double arm raises supine x x 10    TE:    Review Symptoms and exercises  INternal PF stretch 200-300 and 1100 900   Contract relax 3 sec x 10 and release for 5 sec   Breathing exercise  And visual imagery with contraction to release pelvic floor during th eday         NMRED:  PF arnaud 10 sec x 10     PF pre-activation BKFO alternate x 10    PF pre-activation table top alternate x 10     PF pre-activation with bridge x 10 review    NMRED:  PF pre-activation sit to stand x 10     PF pre-activation BS squat x 10    PF pre-activation toe tap alternating x 10       PF pre-activation side stepping 5 feet for 5 laps       NMRED    PF pre-activation BS with arm raises  X 10    PF pre-activation forward lunges on step x 10 alternate    PF pre-activation side lunge on step x 10 each side    PF pre-activation side step with GTB 2 laps  PF pre-activation side step squat GTB 2 laps   PF pre-activation hop x 10       Pelvic floor internal recheck  Last session? Or continue for some abdominal strengthening     Discuss and instruction on ergonomics for spine and help pelvic floor        Unloading at end of day              HEP: 12/21/2021 : PF pre-activation : BKFO, table top and PF arnaud. Continue with PF facilitation exercises     Charges: NMRED 3   Total Timed Treatment: 45 min  Total Treatment Time: 45 min        MUSCULOSKELETAL AND PELVIC FLOOR EVALUATION:   Referring Physician: Dr. Pawan Zuñiga  Diagnosis:    Pelvic floor weakness (B10.54)   Date of Service: 12/16/2021   March next MD appt. PATIENT SUMMARY   Dolores Parsons is a 51year old female  who presents to therapy today with complaints of \"feeling a ball\" in vaginal area still after surgery. Patient reports symptoms at end of busy day on feet or with exercise. Patient is best in morning and when distracted at work.   She reports sometimes feeling like not emptying stool. Patient has hx of ffood allergy wheat, yeast, dairy - break out in hives. Rectocele repair 2013, 2nd repair April 2021    Pt describes pain level: Tail bone pain with certain sitting positions that can be 8-9/10 quick sharp pain in posterior tilt position. It is quick and sudden but goes away    Pregnant Now: No  Obstetrical/Gynecological history:  G 2 P 2 - Full menopause  Urodynamic Test: see chart  Manometry: no  Occupation/Activities: Special education , Exercise: elliptical, walking, bike    POPDI-6 : 41.67  CRAD-8: 31.25  SABINE-6: 25  PFDI-20 : 98/300    Laura Poster describes prior level of function \"felt like ball falling out. \" Pt goals include less discomfort after surgery. Past medical history was reviewed with Sheryl. Significant findings include  Unspecified disorder of thyroid; Rectocele; Visual impairment; Deep vein thrombosis, HERNIA SURGERY; OTHER; POSTERIOR REPAIR            Precautions: DVT  wheat, yeast, dairy     URINARY HABITS  Types of symptoms: No leaking with cough, laugh, sneeze. Urgency with full bladder - no leak  Abdominal/Vaginal Pressure complaints: after intercourse  Urinary Frequency: 3-4 hours    Pad use: no  Pad Change frequency: no  Nocturia: 0-1 x day  Fluid Intake: 6-8 cups panda, able to have fruit and vegetables  Bladder irritants: black tea sometimes    BOWEL HABITS  Types of symptoms: Miralax 1 x week, fiber - Citrucel every other day  Frequency of bowel movements: 1 -2 x day, or every other day  Stool consistency: [place] Stool Scale: Type 3-4  Do you strain with defecation: No   Laxative use: No    SEXUAL HEALTH STATUS    Abdominal soreness after intercourse 3 weeks ago - Aching 7/10  For day after.  Took Tylenol  Sexual [place] Status:   Pain with initial and/or deep penetration: no  Pain with pelvic exam/tampon use:  Not applicable    ASSESSMENT  Laura Holloway presents to physical therapy evaluation with primary c/o rectocele pressure, abdominal discomfort after intercourse, sometimes difficulty full moving stools. The results of the objective tests and measures show significant pelvic floor muscle weakness, lack of PF muscle mass, poor awareness and coordination. Functional deficits include but are not limited to pelvic floor weakness and symptoms at end of day, with exercise and after intercourse. Signs and symptoms are consistent with diagnosis of  Pelvic floor weakness (N81.89). Pt and PT discussed evaluation findings, pathology, POC and HEP. Pt voiced understanding and performs HEP correctly without reported pain. Skilled Pelvic Physical Therapy is medically necessary to address the above impairments and reach functional goals. Patient will benefit from therapy to receive manual therapy for joint and soft tissue manipulation; neuromuscular re-education for pelvic floor coordination and therapeutic exercise for internal and external stretching and functional strengthening. Pt will also benefit from bowel movement positional and technique education. OBJECTIVE:   Posture: normal. Observe sitting with legs crossed  Pelvic Alignment: normal   Deep Tendon Reflexes:  not tested  Gait: not tested    Abdominal Wall Integrity: to be tested    Range Of Motion  Lumbar AROM screen: normal all directions  LE AROM screen: grossly WNL     Strength (MMT) 5/5 FELICITAS LE except 5/5  Transverse Abdominis: 3+/5    Flexibility Summary: WNL FELICITAS hip ER    Special Tests  Not tested     Informed consent for internal pelvic evaluation given: Yes    External Observation:   Voluntary contraction: present   Voluntary relaxation: present  Involuntary contraction: present  Involuntary relaxation: present    Mons pubis: WNL  Labia majora: WNL  Labia minora:  WNL  Urethral meatus: WNL  Introitus: WNL  Perineal body: WNL    Sensory/Reflex:  Vestibule: normal bilaterally  Anal [place]: Not Tested    Internal Examination   Scar: not tested Pelvic Floor Muscle strength: (PERF= Power/Endurance/Reps/Fast) MMT: 2-/3/6/not tested   External Anal Sphincter: not tested at this time  Accessory Muscle Use: holding breath    Tissue Laxity Test:  Anterior Wall: WNL  Posterior Wall: WNL  Apical: WNL      Internal Palpation: WNL     Today's Treatment and Response:   Patient education was provided on objective findings of external and internal evaluation and expectations with treatment outcomes. Educated on pelvic anatomy and function with diagrams and pelvic model and bladder normatives, positioning with BM, breath/belly hard     Patient was instructed in and issued a HEP for: PF facilitation: hip add, hip add with bridge and seated hip ER. PF isolation arnaud 3 sec x 10    Charges: PT Eval Low Complexity, Ex2      Total Timed Treatment: 80 min     Total Treatment Time: 80 min     PLAN OF CARE:    Goals: (to be met in 10 visits)  Patient will demonstrate independence in performing home exercise program.  Patient will demonstrate pelvic floor contraction average 10.0 mvc in 10 sec and 10 reps to increase pelvic support and reduce prolapse symptoms  Patient will report no tail bone pain majority of the week with good sitting posture  Patient will be independent with appropriate positioning for BM and technique. Frequency / Duration: Patient will be seen for 1 x/week or a total of 10 visits over a 90 day period. Treatment will include: Manual Therapy, Neuromuscular Re-education, Therapeutic Exercise and Home Exercise Program instruction     Education or treatment limitation: None  Rehab Potential:good      Patient/Family/Caregiver was advised of these findings, precautions, and treatment options and has agreed to actively participate in planning and for this course of care. Thank you for your referral. Please co-sign or sign and return this letter via fax as soon as possible to [phone].  If you have any questions, please contact me at Dept: [phone]    Sincerely,  Electronically signed by therapist: Jacquelyn Manzo PT  [de-identified] certification required: Yes  I certify the need for these services furnished under this plan of treatment and while under my care.     X___________________________________________________ Date____________________    Certification From: 26/47/3107  To:3/16/2022

## 2022-03-08 ENCOUNTER — OFFICE VISIT (OUTPATIENT)
Dept: PHYSICAL THERAPY | Age: 47
End: 2022-03-08
Attending: OBSTETRICS & GYNECOLOGY
Payer: COMMERCIAL

## 2022-03-08 PROCEDURE — 97110 THERAPEUTIC EXERCISES: CPT

## 2022-03-08 NOTE — PROGRESS NOTES
Dx: Pelvic floor weakness (N81.89)         Insurance (Authorized # of Visits):  10 visits recommended           Authorizing Physician: Dr. Lily Serrano  Next MD visit: none scheduled  Fall Risk: standard         Precautions: DVT  wheat, yeast, dairy          Subjective: Patient noticed some increase tension return to vaginal area this week. She has not had the return of symptoms since last session of being stretched that was 1/11/2022. Pt describes as things feel laxed in vaginal wall. No tail bone pain. Patient felt past week hard to get exercises in and increase stress with family medical concerns. Difficulty feeling squeeze through squats. After session, patient felt no discomfort. Pain: Not applicable/10      Objective:   Issue cross leg stretch for pelvic floor. No discomfort in abdomen with stretch. Issue supine and seated version. Internal exam: 900-1100 restricted      2/22/2022  NMRED 10-12.0 mvc  < 6.0 mvr for 8 sec counts      1/13/2022  2654-2824 and 100-200 restricted. Good releases with stretching   Review HEP and return to exercises this week. 12/21/2021  Patient was instructed in HEP and issued a handout. Patient demonstrate correct technique with HEP. NMRED: 7-13 mvc for 10 sec x 10, resting tone normal <4.0 mvr      Assessment: Patient had significant release of remaining restriction 900-1100. Tension could have been caused from stress at home with family medical concerns. Discuss use of cross leg stretch for self management and C/R BKFO. when symptoms come up. Discuss also option to have  assist with internal stretching and self stretch tools for purchase.     Goals: (to be met in isits)  Patient will demonstrate independence in performing home exercise program.  Patient will demonstrate pelvic floor contraction average 10.0 mvc in 10 sec and 10 reps to increase pelvic support and reduce prolapse symptoms - improvement  Patient will report no tail bone pain majority of the week with good sitting posture  Patient will be independent with appropriate positioning for BM and technique. Plan:  Continue PT to restore pelvic floor strength and coordination with functional tasks with neuromuscular re-education and therapeutic exercise      Date: 12/21/2021  TX#: 2/10 Date:   1/11/2022               TX#: 3/10 Date:     1/18/2022             TX#: 4/10 Date:      1/25/2022            TX#: 5/10 Date: 2/22/2022   Tx#: 6/10 3/8/2022   Tx 7/10    NMRED:  PF arnaud 10 sec x 10     PF pre-activation BKFO alternate x 10    PF pre-activation table top alternate x 10     PF pre-activation double arm raises supine x x 10    TE:    Review Symptoms and exercises  INternal PF stretch 200-300 and 1100 900   Contract relax 3 sec x 10 and release for 5 sec   Breathing exercise  And visual imagery with contraction to release pelvic floor during th eday         NMRED:  PF arnaud 10 sec x 10     PF pre-activation BKFO alternate x 10    PF pre-activation table top alternate x 10     PF pre-activation with bridge x 10 review    NMRED:  PF pre-activation sit to stand x 10     PF pre-activation BS squat x 10    PF pre-activation toe tap alternating x 10       PF pre-activation side stepping 5 feet for 5 laps       NMRED    PF pre-activation BS with arm raises  X 10    PF pre-activation forward lunges on step x 10 alternate    PF pre-activation side lunge on step x 10 each side    PF pre-activation side step with GTB 2 laps  PF pre-activation side step squat GTB 2 laps   PF pre-activation hop x 10       Pelvic floor internal re-eval  TE:  Cross leg stretch 30 sec x 3 on each side  Seated cross leg stretch instructed. Internal PF stretch 900-1100, 200-300  [phone]  Instruct application of stretches and PF BKFO to release pelvic floor  Final program including jumping jacks  Surface EMG with squat  Internal stretch if needed  Self stretch tools ?      Discuss and instruction on ergonomics for spine and help pelvic floor Unloading at end of day                HEP: 12/21/2021 : PF pre-activation : BKFO, table top and PF arnaud. Continue with PF facilitation exercises     Charges: Ex 3 Total Timed Treatment: 45 min  Total Treatment Time: 45 min        MUSCULOSKELETAL AND PELVIC FLOOR EVALUATION:   Referring Physician: Dr. Ivana Shoemaker  Diagnosis:    Pelvic floor weakness (J15.80)   Date of Service: 12/16/2021   March next MD appt. PATIENT SUMMARY   Sharon Chavarria is a 51year old female  who presents to therapy today with complaints of \"feeling a ball\" in vaginal area still after surgery. Patient reports symptoms at end of busy day on feet or with exercise. Patient is best in morning and when distracted at work. She reports sometimes feeling like not emptying stool. Patient has hx of ffood allergy wheat, yeast, dairy - break out in hives. Rectocele repair 2013, 2nd repair April 2021    Pt describes pain level: Tail bone pain with certain sitting positions that can be 8-9/10 quick sharp pain in posterior tilt position. It is quick and sudden but goes away    Pregnant Now: No  Obstetrical/Gynecological history:  G 2 P 2 - Full menopause  Urodynamic Test: see chart  Manometry: no  Occupation/Activities: Special education , Exercise: elliptical, walking, bike    POPDI-6 : 41.67  CRAD-8: 31.25  SABINE-6: 25  PFDI-20 : 98/300    Anju Deal describes prior level of function \"felt like ball falling out. \" Pt goals include less discomfort after surgery. Past medical history was reviewed with Sheryl. Significant findings include  Unspecified disorder of thyroid; Rectocele; Visual impairment; Deep vein thrombosis, HERNIA SURGERY; OTHER; POSTERIOR REPAIR            Precautions: DVT  wheat, yeast, dairy     URINARY HABITS  Types of symptoms: No leaking with cough, laugh, sneeze.  Urgency with full bladder - no leak  Abdominal/Vaginal Pressure complaints: after intercourse  Urinary Frequency: 3-4 hours    Pad use: no  Pad Change frequency: no  Nocturia: 0-1 x day  Fluid Intake: 6-8 cups panda, able to have fruit and vegetables  Bladder irritants: black tea sometimes    BOWEL HABITS  Types of symptoms: Miralax 1 x week, fiber - Citrucel every other day  Frequency of bowel movements: 1 -2 x day, or every other day  Stool consistency: [place] Stool Scale: Type 3-4  Do you strain with defecation: No   Laxative use: No    SEXUAL HEALTH STATUS    Abdominal soreness after intercourse 3 weeks ago - Aching 7/10  For day after. Took Tylenol  Sexual [place] Status:   Pain with initial and/or deep penetration: no  Pain with pelvic exam/tampon use:  Not applicable    ASSESSMENT  Isabella Corona presents to physical therapy evaluation with primary c/o rectocele pressure, abdominal discomfort after intercourse, sometimes difficulty full moving stools. The results of the objective tests and measures show significant pelvic floor muscle weakness, lack of PF muscle mass, poor awareness and coordination. Functional deficits include but are not limited to pelvic floor weakness and symptoms at end of day, with exercise and after intercourse. Signs and symptoms are consistent with diagnosis of  Pelvic floor weakness (N81.89). Pt and PT discussed evaluation findings, pathology, POC and HEP. Pt voiced understanding and performs HEP correctly without reported pain. Skilled Pelvic Physical Therapy is medically necessary to address the above impairments and reach functional goals. Patient will benefit from therapy to receive manual therapy for joint and soft tissue manipulation; neuromuscular re-education for pelvic floor coordination and therapeutic exercise for internal and external stretching and functional strengthening. Pt will also benefit from bowel movement positional and technique education.      OBJECTIVE:   Posture: normal. Observe sitting with legs crossed  Pelvic Alignment: normal   Deep Tendon Reflexes:  not tested  Gait: not tested    Abdominal Wall Integrity: to be tested    Range Of Motion  Lumbar AROM screen: normal all directions  LE AROM screen: grossly WNL     Strength (MMT) 5/5 FELICITAS LE except 5/5  Transverse Abdominis: 3+/5    Flexibility Summary: WNL FELICITAS hip ER    Special Tests  Not tested     Informed consent for internal pelvic evaluation given: Yes    External Observation:   Voluntary contraction: present   Voluntary relaxation: present  Involuntary contraction: present  Involuntary relaxation: present    Mons pubis: WNL  Labia majora: WNL  Labia minora: WNL  Urethral meatus: WNL  Introitus: WNL  Perineal body: WNL    Sensory/Reflex:  Vestibule: normal bilaterally  Anal [place]: Not Tested    Internal Examination   Scar: not tested     Pelvic Floor Muscle strength: (PERF= Power/Endurance/Reps/Fast) MMT: 2-/3/6/not tested   External Anal Sphincter: not tested at this time  Accessory Muscle Use: holding breath    Tissue Laxity Test:  Anterior Wall: WNL  Posterior Wall: WNL  Apical: WNL      Internal Palpation: WNL     Today's Treatment and Response:   Patient education was provided on objective findings of external and internal evaluation and expectations with treatment outcomes. Educated on pelvic anatomy and function with diagrams and pelvic model and bladder normatives, positioning with BM, breath/belly hard     Patient was instructed in and issued a HEP for: PF facilitation: hip add, hip add with bridge and seated hip ER.  PF isolation arnaud 3 sec x 10    Charges: PT Eval Low Complexity, Ex2      Total Timed Treatment: 80 min     Total Treatment Time: 80 min     PLAN OF CARE:    Goals: (to be met in 10 visits)  Patient will demonstrate independence in performing home exercise program.  Patient will demonstrate pelvic floor contraction average 10.0 mvc in 10 sec and 10 reps to increase pelvic support and reduce prolapse symptoms  Patient will report no tail bone pain majority of the week with good sitting posture  Patient will be independent with appropriate positioning for BM and technique. Frequency / Duration: Patient will be seen for 1 x/week or a total of 10 visits over a 90 day period. Treatment will include: Manual Therapy, Neuromuscular Re-education, Therapeutic Exercise and Home Exercise Program instruction     Education or treatment limitation: None  Rehab Potential:good      Patient/Family/Caregiver was advised of these findings, precautions, and treatment options and has agreed to actively participate in planning and for this course of care. Thank you for your referral. Please co-sign or sign and return this letter via fax as soon as possible to [phone]. If you have any questions, please contact me at Dept: [phone]    Sincerely,  Electronically signed by therapist: Gal Reese PT  [de-identified] certification required: Yes  I certify the need for these services furnished under this plan of treatment and while under my care.     X___________________________________________________ Date____________________    Certification From: 65/72/2140  To:3/16/2022

## 2022-03-17 ENCOUNTER — OFFICE VISIT (OUTPATIENT)
Dept: PHYSICAL THERAPY | Age: 47
End: 2022-03-17
Attending: FAMILY MEDICINE
Payer: COMMERCIAL

## 2022-03-17 PROCEDURE — 97110 THERAPEUTIC EXERCISES: CPT

## 2022-03-17 NOTE — PROGRESS NOTES
Discharge Summary    Pt has attended 8, cancelled 2 (illness), and no shown 0 visits in Physical Therapy. Dx: Pelvic floor weakness (N81.89)         Insurance (Authorized # of Visits):  10 visits recommended           Authorizing Physician: Dr. Lilian Shoemaker MD visit: none scheduled  Fall Risk: standard         Precautions: DVT  wheat, yeast, dairy          Subjective: Patient no longer has pelvic floor and tail bone sharp pain. Patient went three weeks without perineal  discomfort after manual therapy. She had a recent exacerbation last week that went with manual therapy for 1 week. She also noted that exacerbation correlates with constipation over 2-3 days. She is going to address managing constipation at her 4/1 appt. She has increased fiber. She tries to take Citracel daily. She feels she may need to return to take MiraLAX. Pain: Not applicable/10      Objective: Internal exam: 900-1100 slight restriction that releases quickly with manual therapy. Instruct self stretching manually and also with option of pelvic wand. NMRED: PF 10 -12 mvc for 10 sec x 10, Resting tone <5.0 mvr  Patient independent with appropriate BM positioning and Breathing Belly hard technique. Patient is independent with HEP      Assessment/ Plan: Patient achieved 100% of her goals. Patient has made progress in pelvic floor flexibility, pelvic floor strengthening and coordination. She is not experiencing pain. She has occasional discomfort that she describes is like the prolapse is present. She notes this is occurring when constipation present after 2 days. Recommend consulting Dr Yousuf Adame about balancing constipation and possible GI consult. Patient has also been instructed in self internal stretching with recommendation of pelvic wand. Patient discharged with advance pelvic floor exercises to continue for another 2 months. After this continue with pelvic floor isometrics.        Goals: (to be met in 10 visits)  Patient will demonstrate independence in performing home exercise program. MET  Patient will demonstrate pelvic floor contraction average 10.0 mvc in 10 sec and 10 reps to increase pelvic support and reduce prolapse symptoms - MET  Patient will report no tail bone pain majority of the week with good sitting posture MET  Patient will be independent with appropriate positioning for BM and technique. MET      Patient/Family/Caregiver was advised of these findings, precautions, and treatment options and has agreed to actively participate in planning and for this course of care. Thank you for your referral. If you have any questions, please contact me at Dept: [phone]. Sincerely,  Electronically signed by therapist: Darren Ward PT    Physician's certification required: Yes  Please co-sign or sign and return this letter via fax as soon as possible to [phone]. I certify the need for these services furnished under this plan of treatment and while under my care.     X___________________________________________________ Date____________________    Certification From: 1/53/9819  To:6/15/2022          Date: 12/21/2021  TX#: 2/10 Date:   1/11/2022               TX#: 3/10 Date:     1/18/2022             TX#: 4/10 Date:      1/25/2022            TX#: 5/10 Date: 2/22/2022   Tx#: 6/10 3/8/2022   Tx 7/10 Date: 3/17/2022   Tx 8/10   NMRED:  PF arnaud 10 sec x 10     PF pre-activation BKFO alternate x 10    PF pre-activation table top alternate x 10     PF pre-activation double arm raises supine x x 10    TE:    Review Symptoms and exercises  INternal PF stretch 200-300 and 1100 900   Contract relax 3 sec x 10 and release for 5 sec   Breathing exercise  And visual imagery with contraction to release pelvic floor during th eday         NMRED:  PF arnaud 10 sec x 10     PF pre-activation BKFO alternate x 10    PF pre-activation table top alternate x 10     PF pre-activation with bridge x 10 review    NMRED:  PF pre-activation sit to stand x 10     PF pre-activation BS squat x 10    PF pre-activation toe tap alternating x 10       PF pre-activation side stepping 5 feet for 5 laps       NMRED    PF pre-activation BS with arm raises  X 10    PF pre-activation forward lunges on step x 10 alternate    PF pre-activation side lunge on step x 10 each side    PF pre-activation side step with GTB 2 laps  PF pre-activation side step squat GTB 2 laps   PF pre-activation hop x 10       Pelvic floor internal re-eval  TE:  Cross leg stretch 30 sec x 3 on each side  Seated cross leg stretch instructed. Internal PF stretch 900-1100, 200-300  [phone]  Instruct application of stretches and PF BKFO to release pelvic floor  TE:  Cross leg stretch 30 sec x 3 on each side  Seated cross leg stretch instructed. Internal PF stretch 900-1100, 200-300  900-1100  Instruct self stretch manually and with pelvic wand. PF pre-activation   Forward lunge with arms over head x 10 alternate  Side lunge with 3 lb ball chest to hip x 10 on both sides  Progression of modified jumping norma to jumping norma with low reps x 10     Discuss and instruction on ergonomics for spine and help pelvic floor    Review positioning for BM and Breath with belly hard     Unloading at end of day                HEP: 12/21/2021 : PF pre-activation : BKFO, table top and PF arnaud. Continue with PF facilitation exercises     Charges: Ex 3 Total Timed Treatment: 45 min  Total Treatment Time: 45 min        MUSCULOSKELETAL AND PELVIC FLOOR EVALUATION:   Referring Physician: Dr. Coral Toure  Diagnosis:    Pelvic floor weakness (R54.87)   Date of Service: 12/16/2021   March next MD appt. PATIENT SUMMARY   Blayne Cruz is a 51year old female  who presents to therapy today with complaints of \"feeling a ball\" in vaginal area still after surgery. Patient reports symptoms at end of busy day on feet or with exercise. Patient is best in morning and when distracted at work.   She reports sometimes feeling like not emptying stool. Patient has hx of ffood allergy wheat, yeast, dairy - break out in hives. Rectocele repair 2013, 2nd repair April 2021    Pt describes pain level: Tail bone pain with certain sitting positions that can be 8-9/10 quick sharp pain in posterior tilt position. It is quick and sudden but goes away    Pregnant Now: No  Obstetrical/Gynecological history:  G 2 P 2 - Full menopause  Urodynamic Test: see chart  Manometry: no  Occupation/Activities: Special education , Exercise: elliptical, walking, bike    POPDI-6 : 41.67  CRAD-8: 31.25  SABINE-6: 25  PFDI-20 : 98/300    Anju Betts describes prior level of function \"felt like ball falling out. \" Pt goals include less discomfort after surgery. Past medical history was reviewed with Sheryl. Significant findings include  Unspecified disorder of thyroid; Rectocele; Visual impairment; Deep vein thrombosis, HERNIA SURGERY; OTHER; POSTERIOR REPAIR            Precautions: DVT  wheat, yeast, dairy     URINARY HABITS  Types of symptoms: No leaking with cough, laugh, sneeze. Urgency with full bladder - no leak  Abdominal/Vaginal Pressure complaints: after intercourse  Urinary Frequency: 3-4 hours    Pad use: no  Pad Change frequency: no  Nocturia: 0-1 x day  Fluid Intake: 6-8 cups panda, able to have fruit and vegetables  Bladder irritants: black tea sometimes    BOWEL HABITS  Types of symptoms: Miralax 1 x week, fiber - Citrucel every other day  Frequency of bowel movements: 1 -2 x day, or every other day  Stool consistency: [place] Stool Scale: Type 3-4  Do you strain with defecation: No   Laxative use: No    SEXUAL HEALTH STATUS    Abdominal soreness after intercourse 3 weeks ago - Aching 7/10  For day after.  Took Tylenol  Sexual [place] Status:   Pain with initial and/or deep penetration: no  Pain with pelvic exam/tampon use:  Not applicable    ASSESSMENT  Anju Betts presents to physical therapy evaluation with primary c/o rectocele pressure, abdominal discomfort after intercourse, sometimes difficulty full moving stools. The results of the objective tests and measures show significant pelvic floor muscle weakness, lack of PF muscle mass, poor awareness and coordination. Functional deficits include but are not limited to pelvic floor weakness and symptoms at end of day, with exercise and after intercourse. Signs and symptoms are consistent with diagnosis of  Pelvic floor weakness (N81.89). Pt and PT discussed evaluation findings, pathology, POC and HEP. Pt voiced understanding and performs HEP correctly without reported pain. Skilled Pelvic Physical Therapy is medically necessary to address the above impairments and reach functional goals. Patient will benefit from therapy to receive manual therapy for joint and soft tissue manipulation; neuromuscular re-education for pelvic floor coordination and therapeutic exercise for internal and external stretching and functional strengthening. Pt will also benefit from bowel movement positional and technique education. OBJECTIVE:   Posture: normal. Observe sitting with legs crossed  Pelvic Alignment: normal   Deep Tendon Reflexes:  not tested  Gait: not tested    Abdominal Wall Integrity: to be tested    Range Of Motion  Lumbar AROM screen: normal all directions  LE AROM screen: grossly WNL     Strength (MMT) 5/5 FELICITAS LE except 5/5  Transverse Abdominis: 3+/5    Flexibility Summary: WNL FELICITAS hip ER    Special Tests  Not tested     Informed consent for internal pelvic evaluation given: Yes    External Observation:   Voluntary contraction: present   Voluntary relaxation: present  Involuntary contraction: present  Involuntary relaxation: present    Mons pubis: WNL  Labia majora: WNL  Labia minora:  WNL  Urethral meatus: WNL  Introitus: WNL  Perineal body: WNL    Sensory/Reflex:  Vestibule: normal bilaterally  Anal Camp: Not Tested    Internal Examination   Scar: not tested     Pelvic Floor Muscle strength: (PERF= Power/Endurance/Reps/Fast) MMT: 2-/3/6/not tested   External Anal Sphincter: not tested at this time  Accessory Muscle Use: holding breath    Tissue Laxity Test:  Anterior Wall: WNL  Posterior Wall: WNL  Apical: WNL      Internal Palpation: WNL     Today's Treatment and Response:   Patient education was provided on objective findings of external and internal evaluation and expectations with treatment outcomes. Educated on pelvic anatomy and function with diagrams and pelvic model and bladder normatives, positioning with BM, breath/belly hard     Patient was instructed in and issued a HEP for: PF facilitation: hip add, hip add with bridge and seated hip ER. PF isolation arnaud 3 sec x 10    Charges: PT Eval Low Complexity, Ex2      Total Timed Treatment: 80 min     Total Treatment Time: 80 min     PLAN OF CARE:    Goals: (to be met in 10 visits)  Patient will demonstrate independence in performing home exercise program.  Patient will demonstrate pelvic floor contraction average 10.0 mvc in 10 sec and 10 reps to increase pelvic support and reduce prolapse symptoms  Patient will report no tail bone pain majority of the week with good sitting posture  Patient will be independent with appropriate positioning for BM and technique. Frequency / Duration: Patient will be seen for 1 x/week or a total of 10 visits over a 90 day period. Treatment will include: Manual Therapy, Neuromuscular Re-education, Therapeutic Exercise and Home Exercise Program instruction     Education or treatment limitation: None  Rehab Potential:good      Patient/Family/Caregiver was advised of these findings, precautions, and treatment options and has agreed to actively participate in planning and for this course of care. Thank you for your referral. Please co-sign or sign and return this letter via fax as soon as possible to [phone].  If you have any questions, please contact me at Dept: [phone]    Sincerely,  Electronically signed by therapist: Yudi Moyer PT  [de-identified] certification required: Yes  I certify the need for these services furnished under this plan of treatment and while under my care.     X___________________________________________________ Date____________________    Certification From: 75/78/8395  To:3/16/2022

## 2022-04-01 ENCOUNTER — OFFICE VISIT (OUTPATIENT)
Dept: UROLOGY | Facility: CLINIC | Age: 47
End: 2022-04-01
Attending: OBSTETRICS & GYNECOLOGY
Payer: COMMERCIAL

## 2022-04-01 VITALS — BODY MASS INDEX: 24.71 KG/M2 | HEIGHT: 62.25 IN | WEIGHT: 136 LBS | TEMPERATURE: 97 F

## 2022-04-01 DIAGNOSIS — Z98.890 POST-OPERATIVE STATE: ICD-10-CM

## 2022-04-01 DIAGNOSIS — K59.00 CONSTIPATION: ICD-10-CM

## 2022-04-01 DIAGNOSIS — N81.89 PELVIC FLOOR WEAKNESS: Primary | ICD-10-CM

## 2022-04-01 DIAGNOSIS — N81.6 RECTOCELE: ICD-10-CM

## 2022-04-01 DIAGNOSIS — N95.2 POSTMENOPAUSAL ATROPHIC VAGINITIS: ICD-10-CM

## 2022-04-01 PROCEDURE — 99212 OFFICE O/P EST SF 10 MIN: CPT

## 2022-04-01 RX ORDER — ESTRADIOL 0.1 MG/G
CREAM VAGINAL
Qty: 42.5 G | Refills: 3 | Status: SHIPPED | OUTPATIENT
Start: 2022-04-01

## 2022-04-22 ENCOUNTER — APPOINTMENT (OUTPATIENT)
Dept: CT IMAGING | Facility: HOSPITAL | Age: 47
End: 2022-04-22
Attending: EMERGENCY MEDICINE
Payer: COMMERCIAL

## 2022-04-22 ENCOUNTER — HOSPITAL ENCOUNTER (EMERGENCY)
Facility: HOSPITAL | Age: 47
Discharge: HOME | End: 2022-04-22
Attending: EMERGENCY MEDICINE
Payer: COMMERCIAL

## 2022-04-22 VITALS
WEIGHT: 132 LBS | RESPIRATION RATE: 18 BRPM | OXYGEN SATURATION: 96 % | HEART RATE: 90 BPM | DIASTOLIC BLOOD PRESSURE: 75 MMHG | TEMPERATURE: 98 F | BODY MASS INDEX: 24 KG/M2 | SYSTOLIC BLOOD PRESSURE: 121 MMHG

## 2022-04-22 DIAGNOSIS — R10.9 FLANK PAIN: Primary | ICD-10-CM

## 2022-04-22 LAB
ALBUMIN SERPL-MCNC: 3.8 G/DL (ref 3.4–5)
ALBUMIN/GLOB SERPL: 1.2 {RATIO} (ref 1–2)
ALP LIVER SERPL-CCNC: 52 U/L
ALT SERPL-CCNC: 26 U/L
ANION GAP SERPL CALC-SCNC: 4 MMOL/L (ref 0–18)
AST SERPL-CCNC: 24 U/L (ref 15–37)
B-HCG UR QL: NEGATIVE
BASOPHILS # BLD AUTO: 0.07 X10(3) UL (ref 0–0.2)
BASOPHILS NFR BLD AUTO: 1.1 %
BILIRUB SERPL-MCNC: 0.5 MG/DL (ref 0.1–2)
BILIRUB UR QL STRIP.AUTO: NEGATIVE
BUN BLD-MCNC: 13 MG/DL (ref 7–18)
CALCIUM BLD-MCNC: 9 MG/DL (ref 8.5–10.1)
CHLORIDE SERPL-SCNC: 107 MMOL/L (ref 98–112)
CO2 SERPL-SCNC: 28 MMOL/L (ref 21–32)
COLOR UR AUTO: YELLOW
CREAT BLD-MCNC: 0.76 MG/DL
EOSINOPHIL # BLD AUTO: 0.12 X10(3) UL (ref 0–0.7)
EOSINOPHIL NFR BLD AUTO: 1.8 %
ERYTHROCYTE [DISTWIDTH] IN BLOOD BY AUTOMATED COUNT: 13.6 %
GLOBULIN PLAS-MCNC: 3.1 G/DL (ref 2.8–4.4)
GLUCOSE BLD-MCNC: 115 MG/DL (ref 70–99)
GLUCOSE UR STRIP.AUTO-MCNC: NEGATIVE MG/DL
HCT VFR BLD AUTO: 43.2 %
HGB BLD-MCNC: 14.5 G/DL
IMM GRANULOCYTES # BLD AUTO: 0.02 X10(3) UL (ref 0–1)
IMM GRANULOCYTES NFR BLD: 0.3 %
LEUKOCYTE ESTERASE UR QL STRIP.AUTO: NEGATIVE
LYMPHOCYTES # BLD AUTO: 2.29 X10(3) UL (ref 1–4)
LYMPHOCYTES NFR BLD AUTO: 34.7 %
MCH RBC QN AUTO: 31.4 PG (ref 26–34)
MCHC RBC AUTO-ENTMCNC: 33.6 G/DL (ref 31–37)
MCV RBC AUTO: 93.5 FL
MONOCYTES # BLD AUTO: 0.71 X10(3) UL (ref 0.1–1)
MONOCYTES NFR BLD AUTO: 10.8 %
NEUTROPHILS # BLD AUTO: 3.39 X10 (3) UL (ref 1.5–7.7)
NEUTROPHILS # BLD AUTO: 3.39 X10(3) UL (ref 1.5–7.7)
NEUTROPHILS NFR BLD AUTO: 51.3 %
NITRITE UR QL STRIP.AUTO: NEGATIVE
OSMOLALITY SERPL CALC.SUM OF ELEC: 289 MOSM/KG (ref 275–295)
PH UR STRIP.AUTO: 5 [PH] (ref 5–8)
PLATELET # BLD AUTO: 282 10(3)UL (ref 150–450)
POTASSIUM SERPL-SCNC: 3.7 MMOL/L (ref 3.5–5.1)
PROT SERPL-MCNC: 6.9 G/DL (ref 6.4–8.2)
PROT UR STRIP.AUTO-MCNC: NEGATIVE MG/DL
RBC # BLD AUTO: 4.62 X10(6)UL
RBC UR QL AUTO: NEGATIVE
SODIUM SERPL-SCNC: 139 MMOL/L (ref 136–145)
SP GR UR STRIP.AUTO: 1.03 (ref 1–1.03)
UROBILINOGEN UR STRIP.AUTO-MCNC: <2 MG/DL
WBC # BLD AUTO: 6.6 X10(3) UL (ref 4–11)

## 2022-04-22 PROCEDURE — 96374 THER/PROPH/DIAG INJ IV PUSH: CPT

## 2022-04-22 PROCEDURE — 85025 COMPLETE CBC W/AUTO DIFF WBC: CPT

## 2022-04-22 PROCEDURE — 99284 EMERGENCY DEPT VISIT MOD MDM: CPT

## 2022-04-22 PROCEDURE — 81003 URINALYSIS AUTO W/O SCOPE: CPT | Performed by: EMERGENCY MEDICINE

## 2022-04-22 PROCEDURE — 96361 HYDRATE IV INFUSION ADD-ON: CPT

## 2022-04-22 PROCEDURE — 85025 COMPLETE CBC W/AUTO DIFF WBC: CPT | Performed by: EMERGENCY MEDICINE

## 2022-04-22 PROCEDURE — 81025 URINE PREGNANCY TEST: CPT

## 2022-04-22 PROCEDURE — 80053 COMPREHEN METABOLIC PANEL: CPT | Performed by: EMERGENCY MEDICINE

## 2022-04-22 PROCEDURE — 74176 CT ABD & PELVIS W/O CONTRAST: CPT | Performed by: EMERGENCY MEDICINE

## 2022-04-22 RX ORDER — ORPHENADRINE CITRATE 100 MG/1
100 TABLET, EXTENDED RELEASE ORAL 2 TIMES DAILY PRN
Qty: 10 TABLET | Refills: 0 | Status: SHIPPED | OUTPATIENT
Start: 2022-04-22

## 2022-04-22 RX ORDER — KETOROLAC TROMETHAMINE 10 MG/1
10 TABLET, FILM COATED ORAL EVERY 6 HOURS PRN
Qty: 20 TABLET | Refills: 0 | Status: SHIPPED | OUTPATIENT
Start: 2022-04-22

## 2022-04-22 RX ORDER — KETOROLAC TROMETHAMINE 30 MG/ML
30 INJECTION, SOLUTION INTRAMUSCULAR; INTRAVENOUS ONCE
Status: COMPLETED | OUTPATIENT
Start: 2022-04-22 | End: 2022-04-22

## 2022-04-22 NOTE — ED INITIAL ASSESSMENT (HPI)
A/O x 4. Patient presents with left sided flank pain that radiates to the lower abdomen since this morning.   Denies any nausea, vomiting, or urinary issues

## (undated) DEVICE — CAUTERY PENCIL

## (undated) DEVICE — SCD SLEEVE KNEE HI BLEND

## (undated) DEVICE — BAG DRAIN INFECTION CNTRL 2000

## (undated) DEVICE — SOL  .9 1000ML BTL

## (undated) DEVICE — #15 STERILE STAINLESS BLADE: Brand: STERILE STAINLESS BLADES

## (undated) DEVICE — STERILE POLYISOPRENE POWDER-FREE SURGICAL GLOVES: Brand: PROTEXIS

## (undated) DEVICE — BULB SYRINGE,IRRIGATION WITH PROTECTIVE CAP: Brand: DOVER

## (undated) DEVICE — 3M™ STERI-DRAPE™ INSTRUMENT POUCH 1018: Brand: STERI-DRAPE™

## (undated) DEVICE — GYN CDS: Brand: MEDLINE INDUSTRIES, INC.

## (undated) DEVICE — SUTURE VICRYL 1 CT-1

## (undated) DEVICE — MEDI-VAC NON-CONDUCTIVE SUCTION TUBING: Brand: CARDINAL HEALTH

## (undated) DEVICE — REM POLYHESIVE ADULT PATIENT RETURN ELECTRODE: Brand: VALLEYLAB

## (undated) DEVICE — TUBING CYSTO

## (undated) DEVICE — COVER,MAYO STAND,STERILE: Brand: MEDLINE

## (undated) DEVICE — SUTURE VICRYL 2-0 CT-1

## (undated) DEVICE — STANDARD HYPODERMIC NEEDLE,POLYPROPYLENE HUB: Brand: MONOJECT

## (undated) DEVICE — RETRACTOR LONE STAR STAYS LG

## (undated) DEVICE — Device

## (undated) NOTE — LETTER
[address]  [id number]  [place] [address]  [facility]: [phone]  FAX: [phone]       Date:  4/9/2021     Patient:  Lonzo Sandifer         To Whom It May Concern:     The above names patient i

## (undated) NOTE — Clinical Note
Hi Dr. Lauretha Severin,    I hope you are doing well. I saw Ms. [address] [facility] today. I believe you are planning a Rectocele repair. Given her history of DVT provoked by surgery she has a higher risk of DVT. Would you be ok with Lovenox 40 mg x 10 days post-operatively?  She

## (undated) NOTE — Clinical Note
Juan Camarillo,Can you call her and let her know to schedule a mammogram. Looks like she has not had one in a couple years, unless it was done at [facility]. Thanks!